# Patient Record
(demographics unavailable — no encounter records)

---

## 2024-10-23 NOTE — REASON FOR VISIT
[TextEntry] : Reason for visit: Follow Up - Med Check/BP Check Voids per day:  6-7  Voids per night:   2 Urge incontinence: Yes (+) leakage (+) urgency Stress incontinence: No  Constipation: No    Fecal incontinence: Rare Vaginal bulge: No

## 2024-10-23 NOTE — PHYSICAL EXAM
[Chaperone Present] : A chaperone was present in the examining room during all aspects of the physical examination [92587] : A chaperone was present during the pelvic exam. [No Acute Distress] : in no acute distress [Well developed] : well developed [Well Nourished] : ~L well nourished [FreeTextEntry2] : Danna [FreeTextEntry1] : Indication:  Urethra was prepped in sterile fashion and then a sterile non indwelling catheter (14F) was used by me to drain the bladder. The patient tolerated the procedure well.  void: 0 PVR: 50cc

## 2024-10-23 NOTE — COUNSELING
[FreeTextEntry1] : If you feel like you have an infection it is important for you to call our office and we will arrange testing of your urine.  We will contact you if the urine results are abnormal.  Please continue taking Silodosin 4mg at bedtime. Refills sent to your pharmacy,  Please STOP taking Myrbetriq 25 mg.  Please begin taking Myrbetriq 50 mg. It takes up to 6 weeks to go into full effect. Please  your refill when you complete the 1st bottle.  Please call my office if you have any issues with the cost or side effects of the medication.   Schedule a 2 months follow up med check appointment with [facility] Gonzalez.

## 2024-10-23 NOTE — DISCUSSION/SUMMARY
[FreeTextEntry1] : Urge Incontinence Urine culture obtained. Will follow up. Will treat accordingly if necessary Will increase Rx Myrbetriq 50mg QD, 30 days, 1 refill Precautions reviewed. Will return in 2 months for follow up or earlier if she has any issues.   KAREN PVR: 50cc normal Continue Silodosin 4mg QHS, refills sent, 30 days, 6 refills

## 2024-10-23 NOTE — PHYSICAL EXAM
[Chaperone Present] : A chaperone was present in the examining room during all aspects of the physical examination [[id number]] : A chaperone was present during the pelvic exam. [No Acute Distress] : in no acute distress [Well developed] : well developed [Well Nourished] : ~L well nourished [FreeTextEntry2] : Danna [FreeTextEntry1] : Indication:  Urethra was prepped in sterile fashion and then a sterile non indwelling catheter (14F) was used by me to drain the bladder. The patient tolerated the procedure well.  void: 0 PVR: 50cc

## 2024-10-23 NOTE — HISTORY OF PRESENT ILLNESS
[FreeTextEntry1] : Patient is here for 3 months med check for urge incontinence, KAREN.  Last seen on 7/17/24 for med check.   s/p vaginal hysterectomy/BS0 (fibroids), s/p L inguinal hernia surgery with mesh  hx of MS, wheelchair no glaucoma hx of breast cancer no prolapse  s/p Trospium 20mg BID  Bowel recipe for fecal incontinence  S/p Detrol 4mg: no benefit  Tizanidine 2mg 2 tabs QHS, from another provider for MS Baclofen 10mg BID, from another provider for MS  s/p Detrol-no improvement, PVR 150cc  Silodosin 4 mg QHS normal PVR on Silodosin  s/p Gemtesa 75 mg: elevated PVR: 140cc with Gemtesa and Silodosin    Myrbetriq 25mg  Today, patient states that she has been on Myrbetriq 25mg for 2 months and it has not been helping at all. Feels that she empties the bladder well. C/o feeling dizzy and upset stomach immediately after taking Silodosin, but then she goes to sleep and has no issues in the morning. Patient does not feel she has an infection.

## 2024-12-16 NOTE — HISTORY OF PRESENT ILLNESS
[Wheelchair] : a wheelchair [FreeTextEntry1] : Painful nails and calluses  - difficulty ambulating - painful nails with shoe wear - Unable to cut her nails

## 2024-12-16 NOTE — PHYSICAL EXAM
[Ankle Swelling (On Exam)] : present [Ankle Swelling Bilaterally] : bilaterally  [Ankle Swelling On The Right] : mild [] : on both lower extremities [Delayed in the Right Toes] : capillary refills normal in right toes [Delayed in the Left Toes] : capillary refills normal in the left toes [de-identified] : toes rectus 2-5 B/L. Diminished ankle and toe ROM B/L feet  [FreeTextEntry1] : Diminished sensation B/L feet

## 2024-12-16 NOTE — REASON FOR VISIT
-- Romayne  [Follow-Up Visit] : a follow-up visit for [FreeTextEntry2] : Painful nails and calluses

## 2024-12-16 NOTE — ASSESSMENT
[FreeTextEntry1] : - Dbx of nails x10 -Dbx of callus x2  - Cont wound care with compression  -Use compression stockings  - F/u 3 months  [Verbal] : verbal [Good - alert, interested, motivated] : Good - alert, interested, motivated [Demonstrates independently] : demonstrates independently [Foot Care] : foot care

## 2025-06-03 NOTE — HISTORY OF PRESENT ILLNESS
[Wheelchair] : a wheelchair [FreeTextEntry1] : Painful nails and calluses  - difficulty ambulating, hx of MS,  - painful nails with shoe wear - Unable to cut her nails  - Pain L foot, radiating to the knee

## 2025-06-03 NOTE — ASSESSMENT
[FreeTextEntry1] : - Dbx of nails x10 -Dbx of callus x2  - Cont  with compression  -Use compression stockings  - Rx Accommodative shoes - F/u at [facility]  - F/u 3 months  [Verbal] : verbal [Good - alert, interested, motivated] : Good - alert, interested, motivated [Demonstrates independently] : demonstrates independently [Foot Care] : foot care

## 2025-06-03 NOTE — PHYSICAL EXAM
[Ankle Swelling (On Exam)] : present [Ankle Swelling Bilaterally] : bilaterally  [Ankle Swelling On The Right] : mild [] : on both lower extremities [Delayed in the Right Toes] : capillary refills normal in right toes [Delayed in the Left Toes] : capillary refills normal in the left toes [de-identified] : toes rectus 2-5 B/L. 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 B/L LE weakness  [FreeTextEntry1] : Diminished sensation B/L feet